# Patient Record
(demographics unavailable — no encounter records)

---

## 2025-04-07 NOTE — HISTORY OF PRESENT ILLNESS
[4] : 4 [Dull/Aching] : dull/aching [Sharp] : sharp [Tingling] : tingling [Intermittent] : intermittent [Leisure] : leisure [de-identified] : 04/07/2025: c/o jamming hand when making the bed a couple of weeks ago. pain in the right thumb   States that numbness has not gotten much better since last visit. States that left hand is doing okay today.   08/08/2024 :WILLIAM ECKERT , a 70 year old female, presents today for bilateral hand numbness for the past few months. No specific injury. Numbness is now constant. She also notes locking of the left ring finger. She had a CSI ove+-r a year ago with relief. [] : no [de-identified] : xrays

## 2025-04-07 NOTE — IMAGING
[de-identified] : right hand no swelling or deformity +thenar atrophy full active range of motion decreased sensation to the median nerve intact ulnar and radial sensation ain/pin/ulnar motor intact +tinels, phalens and durkans, negative spurling sign palpable pulses with CR<2s full motion to the elbow, shoulder no opening to stress, but pain of the RCL and UCL of MPj   Right hand x-rays 3 views taken today in office demonstrate ulnar subluxation of the thumb MP